# Patient Record
(demographics unavailable — no encounter records)

---

## 2024-12-05 NOTE — LETTER BODY
[Dear  ___] : Dear  [unfilled], [Courtesy Letter:] : I had the pleasure of seeing your patient, [unfilled], in my office today. [Please see my note below.] : Please see my note below. [Consult Closing:] : Thank you very much for allowing me to participate in the care of this patient.  If you have any questions, please do not hesitate to contact me. [Sincerely,] : Sincerely, [FreeTextEntry3] : Shea Dolan MD Director of Robotic Education [facility]   [email or web address] [phone]

## 2024-12-05 NOTE — HISTORY OF PRESENT ILLNESS
[FreeTextEntry1] : Name JANINA BEACH  MRN [id number]   Oct 10 1979  Contact Number: [phone] ----------------------------------------------------------------------------------------------------------------------------------------- Date of First Visit: 24 Referring Provider/PCP: Dr. Shruti Foley f: [phone] -----------------------------------------------------------------------------------------------------------------------------------------  CC: frequency, nocturia  History of Present Illness: JANINA BEACH is a 45 year old male who presents for evaluation urinary frequency/nocturia. Has been going on for at least 1 year. Daytime less an issues - may go up 4-8 times but not bothersome. No significant urgency. Main issue is nocturia - 4x/night on average. At times will wake up from sleep to urinate and other times when wakes for other times and then goes to urinate. Strong stream. Generally feels like empties well.  Patient denies cardiac issues. Patient reports snores at night - has sleep sam and that he has heard it but never wakes up from sleep. Patient fluid intake before bed varies - sometimes close to bed other times a few hours - unsure if correlates with number of night wakes. Drinks 2-3 cups caffeine daily - stops 4-5PM. Occasional herbal tea before bed. No alcohol. Denies diabetes - had preDM but then lost weight intentionally.  No history UTIs, nephrolithiasis, hematuria.  IPSS 13 QOL 5 LOWELL 16 PVR (to ensure adequate emptying): 9   PMH: none PSH: shoulder surgery Family History: no  malignancies  Social: single, no children, works for [employer], never smoker, rare alcohol, no recreational drugs Meds: finasteride, minoxidil  Allergies: NKDA ROS: no fevers, chills, dysuria ----------------------------------------------------------------------------------------------------------------------------------------- Labs: 24: Chlamydia/gonorrhea neg/neg Trich neg UA nit neg LE neg 0 RBC 0 WBC PSA 0.51 --> on Finasteride 1 RPR non-reactive Hb A1c 5.7 Cr 0.89

## 2024-12-05 NOTE — HISTORY OF PRESENT ILLNESS
[FreeTextEntry1] : Name JANINA BEACH  MRN [id number]   Oct 10 1979  Contact Number: [phone] ----------------------------------------------------------------------------------------------------------------------------------------- Date of First Visit: 24 Referring Provider/PCP: Dr. Shruti Foley f: [phone] -----------------------------------------------------------------------------------------------------------------------------------------  CC: frequency, nocturia  History of Present Illness: JANINA BEACH is a 45 year old male who presents for evaluation urinary frequency/nocturia. Has been going on for at least 1 year. Daytime less an issues - may go up 4-8 times but not bothersome. No significant urgency. Main issue is nocturia - 4x/night on average. At times will wake up from sleep to urinate and other times when wakes for other times and then goes to urinate. Strong stream. Generally feels like empties well.  Patient denies cardiac issues. Patient reports snores at night - has sleep sam and that he has heard it but never wakes up from sleep. Patient fluid intake before bed varies - sometimes close to bed other times a few hours - unsure if correlates with number of night wakes. Drinks 2-3 cups caffeine daily - stops 4-5PM. Occasional herbal tea before bed. No alcohol. Denies diabetes - had preDM but then lost weight intentionally.  No history UTIs, nephrolithiasis, hematuria.  IPSS 13 QOL 5 LOWELL 16 PVR (to ensure adequate emptying): 9   PMH: none PSH: shoulder surgery Family History: no  malignancies  Social: single, no children, works for [employer], never smoker, rare alcohol, no recreational drugs Meds: finasteride, minoxidil  Allergies: NKDA ROS: no fevers, chills, dysuria ----------------------------------------------------------------------------------------------------------------------------------------- Labs: 24: Chlamydia/gonorrhea neg/neg Trich neg UA nit neg LE neg 0 RBC 0 WBC PSA 0.51 --> on Finasteride 1 RPR non-reactive Hb A1c 5.7 Cr 0.89

## 2024-12-05 NOTE — LETTER BODY
[Dear  ___] : Dear  [unfilled], [Courtesy Letter:] : I had the pleasure of seeing your patient, [unfilled], in my office today. [Please see my note below.] : Please see my note below. [Consult Closing:] : Thank you very much for allowing me to participate in the care of this patient.  If you have any questions, please do not hesitate to contact me. [Sincerely,] : Sincerely, [FreeTextEntry3] : Shea Dolan MD Director of Robotic Education [facility]   [email or web address] [phone]

## 2024-12-05 NOTE — ASSESSMENT
[FreeTextEntry1] : 45 year old with primary nocturia. Occasional mild daytime frequency but not bothersome. We discussed his nocturia. Discussed the following measures to decrease nocturia: reduction of fluid intake in the afternoon and at least 4 hours before bed, reduction of evening consumption of diuretic fluids, including caffeine and alcohol, double-voiding prior to bedtime. We discussed role of SERGIO and nocturia - patient reports snoring - will set up with sleep consult.  Plan: - outside UA and culture reviewed - no need for repeat - no fluids 4 hours before bed - sleep study - consult placed - no caffeine in PM - if no improvement with behavioral changes will plan for voiding diary - fu 6 weeks